# Patient Record
Sex: FEMALE | Race: WHITE | NOT HISPANIC OR LATINO | Employment: OTHER | URBAN - METROPOLITAN AREA
[De-identification: names, ages, dates, MRNs, and addresses within clinical notes are randomized per-mention and may not be internally consistent; named-entity substitution may affect disease eponyms.]

---

## 2021-02-13 ENCOUNTER — OFFICE VISIT (OUTPATIENT)
Dept: URBAN - METROPOLITAN AREA CLINIC 68 | Facility: CLINIC | Age: 81
End: 2021-02-13

## 2021-03-13 ENCOUNTER — OFFICE VISIT (OUTPATIENT)
Dept: URBAN - METROPOLITAN AREA CLINIC 68 | Facility: CLINIC | Age: 81
End: 2021-03-13

## 2021-03-31 ENCOUNTER — OFFICE VISIT (OUTPATIENT)
Dept: URBAN - METROPOLITAN AREA CLINIC 68 | Facility: CLINIC | Age: 81
End: 2021-03-31

## 2021-12-07 ENCOUNTER — OFFICE VISIT (OUTPATIENT)
Dept: URBAN - METROPOLITAN AREA CLINIC 68 | Facility: CLINIC | Age: 81
End: 2021-12-07

## 2021-12-27 ENCOUNTER — TELEPHONE ENCOUNTER (OUTPATIENT)
Dept: URBAN - METROPOLITAN AREA CLINIC 68 | Facility: CLINIC | Age: 81
End: 2021-12-27

## 2022-01-24 ENCOUNTER — OFFICE VISIT (OUTPATIENT)
Dept: URBAN - METROPOLITAN AREA CLINIC 68 | Facility: CLINIC | Age: 82
End: 2022-01-24

## 2022-01-24 ENCOUNTER — TELEPHONE ENCOUNTER (OUTPATIENT)
Dept: URBAN - METROPOLITAN AREA CLINIC 68 | Facility: CLINIC | Age: 82
End: 2022-01-24

## 2022-01-27 ENCOUNTER — OFFICE VISIT (OUTPATIENT)
Dept: URBAN - METROPOLITAN AREA SURGERY CENTER 12 | Facility: SURGERY CENTER | Age: 82
End: 2022-01-27

## 2022-01-28 ENCOUNTER — LAB OUTSIDE AN ENCOUNTER (OUTPATIENT)
Dept: URBAN - METROPOLITAN AREA CLINIC 68 | Facility: CLINIC | Age: 82
End: 2022-01-28

## 2022-01-28 LAB — 01: (no result)

## 2022-02-01 ENCOUNTER — TELEPHONE ENCOUNTER (OUTPATIENT)
Dept: URBAN - METROPOLITAN AREA CLINIC 68 | Facility: CLINIC | Age: 82
End: 2022-02-01

## 2022-02-11 ENCOUNTER — OFFICE VISIT (OUTPATIENT)
Dept: URBAN - METROPOLITAN AREA CLINIC 68 | Facility: CLINIC | Age: 82
End: 2022-02-11

## 2022-04-08 ENCOUNTER — OFFICE VISIT (OUTPATIENT)
Dept: URBAN - METROPOLITAN AREA CLINIC 68 | Facility: CLINIC | Age: 82
End: 2022-04-08

## 2022-06-04 ENCOUNTER — TELEPHONE ENCOUNTER (OUTPATIENT)
Dept: URBAN - METROPOLITAN AREA CLINIC 68 | Facility: CLINIC | Age: 82
End: 2022-06-04

## 2022-06-04 RX ORDER — WHEAT DEXTRIN 3 G/4 G
POWDER (GRAM) ORAL AS DIRECTED
Qty: 1 | Refills: 0 | OUTPATIENT
Start: 2019-12-02 | End: 2020-01-01

## 2022-06-04 RX ORDER — PANTOPRAZOLE SODIUM 40 MG/1
TABLET, DELAYED RELEASE ORAL EVERY 12 HOURS
Qty: 180 | Refills: 180 | OUTPATIENT
Start: 2020-05-05 | End: 2021-12-07

## 2022-06-04 RX ORDER — POLYETHYLENE GLYCOL 3350, SODIUM SULFATE, SODIUM CHLORIDE, POTASSIUM CHLORIDE, ASCORBIC ACID, SODIUM ASCORBATE 140-9-5.2G
KIT ORAL DAILY
Qty: 1 | Refills: 0 | OUTPATIENT
Start: 2021-12-07 | End: 2021-12-08

## 2022-06-04 RX ORDER — ESOMEPRAZOLE MAGNESIUM 20 MG
CAPSULE,DELAYED RELEASE (ENTERIC COATED) ORAL TWICE A DAY
OUTPATIENT
Start: 2013-09-10 | End: 2019-02-06

## 2022-06-04 RX ORDER — NITROFURANTOIN MONOHYDRATE/MACROCRYSTALLINE 25; 75 MG/1; MG/1
MACROBID( 100MG ORAL  PRN ) INACTIVE -HX ENTRY CAPSULE ORAL PRN
OUTPATIENT
Start: 2021-12-07

## 2022-06-04 RX ORDER — VALACYCLOVIR HYDROCHLORIDE 500 MG/1
VALACYCLOVIR HCL( 500MG ORAL  PRN ) INACTIVE -HX ENTRY TABLET, FILM COATED ORAL PRN
OUTPATIENT
Start: 2019-02-07

## 2022-06-04 RX ORDER — FAMOTIDINE 20 MG/1
TABLET, FILM COATED ORAL DAILY
Qty: 30 | Refills: 0 | OUTPATIENT
Start: 2019-12-02 | End: 2020-01-01

## 2022-06-04 RX ORDER — PANTOPRAZOLE SODIUM 20 MG/1
TABLET, DELAYED RELEASE ORAL DAILY
Qty: 90 | Refills: 0 | OUTPATIENT
Start: 2021-12-07 | End: 2022-01-24

## 2022-06-04 RX ORDER — RANITIDINE 300 MG/1
RANITIDINE HCL( 300MG ORAL  AT BEDTIME ) INACTIVE -HX ENTRY TABLET, FILM COATED ORAL AT BEDTIME
OUTPATIENT
Start: 2021-12-07

## 2022-06-04 RX ORDER — ESOMEPRAZOLE MAGNESIUM 20 MG/1
NEXIUM 24HR( 20MG ORAL 2 TWO TIMES DAILY ) INACTIVE -HX ENTRY CAPSULE, DELAYED RELEASE ORAL
OUTPATIENT
Start: 2019-03-07

## 2022-06-05 ENCOUNTER — TELEPHONE ENCOUNTER (OUTPATIENT)
Dept: URBAN - METROPOLITAN AREA CLINIC 68 | Facility: CLINIC | Age: 82
End: 2022-06-05

## 2022-06-05 RX ORDER — PANTOPRAZOLE SODIUM 40 MG/1
TABLET, DELAYED RELEASE ORAL DAILY
Qty: 90 | Refills: 90 | Status: ACTIVE | COMMUNITY
Start: 2022-04-08

## 2022-06-05 RX ORDER — HYDROCHLOROTHIAZIDE 25 MG/1
HYDROCHLOROTHIAZIDE( 25MG ORAL  DAILY ) ACTIVE -HX ENTRY TABLET ORAL DAILY
Status: ACTIVE | COMMUNITY
Start: 2022-04-08

## 2022-06-05 RX ORDER — HYDROXYUREA 500 MG/1
HYDROXYUREA( 500MG ORAL  DAILY ) ACTIVE -HX ENTRY CAPSULE ORAL DAILY
Status: ACTIVE | COMMUNITY
Start: 2022-04-08

## 2022-06-05 RX ORDER — POLYETHYLENE GLYCOL 3350 17 G/DOSE
POWDER (GRAM) ORAL
Status: ACTIVE | COMMUNITY
Start: 2013-09-10

## 2022-06-05 RX ORDER — FAMOTIDINE 40 MG/1
FAMOTIDINE( 40MG ORAL 1 DAILY ) ACTIVE -HX ENTRY TABLET ORAL DAILY
Status: ACTIVE | COMMUNITY
Start: 2022-04-08

## 2022-06-05 RX ORDER — PRAVASTATIN SODIUM 40 MG/1
PRAVASTATIN SODIUM( 40MG ORAL 1/2 TAB DAILY ) ACTIVE -HX ENTRY TABLET ORAL DAILY
Status: ACTIVE | COMMUNITY
Start: 2022-04-08

## 2022-06-25 ENCOUNTER — TELEPHONE ENCOUNTER (OUTPATIENT)
Age: 82
End: 2022-06-25

## 2022-06-25 RX ORDER — POLYETHYLENE GLYCOL 3350, SODIUM SULFATE, SODIUM CHLORIDE, POTASSIUM CHLORIDE, ASCORBIC ACID, SODIUM ASCORBATE 140-9-5.2G
KIT ORAL DAILY
Qty: 1 | Refills: 0 | OUTPATIENT
Start: 2021-12-07 | End: 2021-12-08

## 2022-06-25 RX ORDER — ESOMEPRAZOLE MAGNESIUM 20 MG/1
NEXIUM 24HR( 20MG ORAL 2 TWO TIMES DAILY ) INACTIVE -HX ENTRY CAPSULE, DELAYED RELEASE ORAL
OUTPATIENT
Start: 2019-03-07

## 2022-06-25 RX ORDER — PANTOPRAZOLE 20 MG/1
TABLET, DELAYED RELEASE ORAL DAILY
Qty: 90 | Refills: 0 | OUTPATIENT
Start: 2021-12-07 | End: 2022-01-24

## 2022-06-25 RX ORDER — FAMOTIDINE 20 MG/1
TABLET ORAL DAILY
Qty: 30 | Refills: 0 | OUTPATIENT
Start: 2019-12-02 | End: 2020-01-01

## 2022-06-25 RX ORDER — PANTOPRAZOLE 40 MG/1
TABLET, DELAYED RELEASE ORAL EVERY 12 HOURS
Qty: 180 | Refills: 180 | OUTPATIENT
Start: 2020-05-05 | End: 2021-12-07

## 2022-06-25 RX ORDER — NITROFURANTOIN MONOHYD/M-CRYST 100 MG
MACROBID( 100MG ORAL  PRN ) INACTIVE -HX ENTRY CAPSULE ORAL PRN
OUTPATIENT
Start: 2021-12-07

## 2022-06-25 RX ORDER — ESOMEPRAZOLE MAGNESIUM 20 MG
CAPSULE,DELAYED RELEASE (ENTERIC COATED) ORAL TWICE A DAY
OUTPATIENT
Start: 2013-09-10 | End: 2019-02-06

## 2022-06-25 RX ORDER — WHEAT DEXTRIN 3 G/4 G
POWDER (GRAM) ORAL AS DIRECTED
Qty: 1 | Refills: 0 | OUTPATIENT
Start: 2019-12-02 | End: 2020-01-01

## 2022-06-25 RX ORDER — VALACYCLOVIR 500 MG/1
VALACYCLOVIR HCL( 500MG ORAL  PRN ) INACTIVE -HX ENTRY TABLET, FILM COATED ORAL PRN
OUTPATIENT
Start: 2019-02-07

## 2022-06-25 RX ORDER — CETIRIZINE HYDROCHLORIDE 10 MG/1
ZYRTEC( 5MG ORAL HALF TAB DAILY ) INACTIVE -HX ENTRY TABLET, FILM COATED ORAL DAILY
OUTPATIENT
Start: 2021-12-07

## 2022-06-26 ENCOUNTER — TELEPHONE ENCOUNTER (OUTPATIENT)
Age: 82
End: 2022-06-26

## 2022-06-26 RX ORDER — HYDROXYUREA 500 MG/1
HYDROXYUREA( 500MG ORAL  DAILY ) ACTIVE -HX ENTRY CAPSULE ORAL DAILY
Status: ACTIVE | COMMUNITY
Start: 2022-04-08

## 2022-06-26 RX ORDER — LORATADINE 5 MG
TABLET,CHEWABLE ORAL
Status: ACTIVE | COMMUNITY
Start: 2013-09-10

## 2022-06-26 RX ORDER — HYDROCHLOROTHIAZIDE 25 MG/1
HYDROCHLOROTHIAZIDE( 25MG ORAL  DAILY ) ACTIVE -HX ENTRY TABLET ORAL DAILY
Status: ACTIVE | COMMUNITY
Start: 2022-04-08

## 2022-06-26 RX ORDER — FAMOTIDINE 40 MG/1
FAMOTIDINE( 40MG ORAL 1 DAILY ) ACTIVE -HX ENTRY TABLET, FILM COATED ORAL DAILY
Status: ACTIVE | COMMUNITY
Start: 2022-04-08

## 2022-06-26 RX ORDER — PRAVASTATIN SODIUM 40 MG/1
PRAVASTATIN SODIUM( 40MG ORAL 1/2 TAB DAILY ) ACTIVE -HX ENTRY TABLET ORAL DAILY
Status: ACTIVE | COMMUNITY
Start: 2022-04-08

## 2022-06-26 RX ORDER — PANTOPRAZOLE 40 MG/1
TABLET, DELAYED RELEASE ORAL DAILY
Qty: 90 | Refills: 90 | Status: ACTIVE | COMMUNITY
Start: 2022-04-08

## 2022-11-03 ENCOUNTER — OFFICE VISIT (OUTPATIENT)
Dept: URBAN - METROPOLITAN AREA CLINIC 68 | Facility: CLINIC | Age: 82
End: 2022-11-03

## 2022-11-03 ENCOUNTER — LAB OUTSIDE AN ENCOUNTER (OUTPATIENT)
Dept: URBAN - METROPOLITAN AREA CLINIC 68 | Facility: CLINIC | Age: 82
End: 2022-11-03

## 2022-11-03 RX ORDER — HYDROCHLOROTHIAZIDE 25 MG/1
HYDROCHLOROTHIAZIDE( 25MG ORAL  DAILY ) ACTIVE -HX ENTRY TABLET ORAL DAILY
Status: ACTIVE | COMMUNITY
Start: 2022-04-08

## 2022-11-03 RX ORDER — POLYETHYLENE GLYCOL 3350 17 G/DOSE
POWDER (GRAM) ORAL
Status: ACTIVE | COMMUNITY
Start: 2013-09-10

## 2022-11-03 RX ORDER — PRAVASTATIN SODIUM 40 MG/1
PRAVASTATIN SODIUM( 40MG ORAL 1/2 TAB DAILY ) ACTIVE -HX ENTRY TABLET ORAL DAILY
Status: ACTIVE | COMMUNITY
Start: 2022-04-08

## 2022-11-03 RX ORDER — PANTOPRAZOLE SODIUM 40 MG/1
TABLET, DELAYED RELEASE ORAL DAILY
Qty: 90 | Refills: 90 | Status: ACTIVE | COMMUNITY
Start: 2022-04-08

## 2022-11-03 RX ORDER — PANTOPRAZOLE SODIUM 40 MG/1
1 TABLET TABLET, DELAYED RELEASE ORAL EVERY 12 HOURS
Qty: 180 TABLET | OUTPATIENT
Start: 2022-11-03

## 2022-11-03 RX ORDER — FAMOTIDINE 40 MG/1
FAMOTIDINE( 40MG ORAL 1 DAILY ) ACTIVE -HX ENTRY TABLET ORAL DAILY
Status: DISCONTINUED | COMMUNITY
Start: 2022-04-08

## 2022-11-03 RX ORDER — HYDROXYUREA 500 MG/1
HYDROXYUREA( 500MG ORAL  DAILY ) ACTIVE -HX ENTRY CAPSULE ORAL DAILY
Status: ACTIVE | COMMUNITY
Start: 2022-04-08

## 2022-11-03 NOTE — HPI-MIGRATED HPI
General : 81 YOF that comes in today for evaluation. She refers that for 3 days she had black stools. She also had been feeling upper abdominal discomfort. She refers she took an OTC medication for her upper abdominal sympthoms, and she feels this cause her black stools. She is unable to specify the medication but it was liquid. Today she has no upper abdominal symptomatology. She refers she had BM this morning and normal.

## 2022-11-17 ENCOUNTER — OFFICE VISIT (OUTPATIENT)
Dept: URBAN - METROPOLITAN AREA SURGERY CENTER 12 | Facility: SURGERY CENTER | Age: 82
End: 2022-11-17

## 2023-04-18 ENCOUNTER — OFFICE VISIT (OUTPATIENT)
Dept: URBAN - METROPOLITAN AREA CLINIC 68 | Facility: CLINIC | Age: 83
End: 2023-04-18

## 2023-04-18 RX ORDER — PANTOPRAZOLE SODIUM 40 MG/1
1 TABLET TABLET, DELAYED RELEASE ORAL EVERY 12 HOURS
Qty: 180 TABLET | Refills: 0
Start: 2022-04-08

## 2023-04-18 RX ORDER — POLYETHYLENE GLYCOL 3350 17 G/DOSE
POWDER (GRAM) ORAL
Status: ACTIVE | COMMUNITY
Start: 2013-09-10

## 2023-04-18 RX ORDER — HYDROXYUREA 500 MG/1
HYDROXYUREA( 500MG ORAL  DAILY ) ACTIVE -HX ENTRY CAPSULE ORAL DAILY
Status: ACTIVE | COMMUNITY
Start: 2022-04-08

## 2023-04-18 RX ORDER — PRAVASTATIN SODIUM 40 MG/1
PRAVASTATIN SODIUM( 40MG ORAL 1/2 TAB DAILY ) ACTIVE -HX ENTRY TABLET ORAL DAILY
Status: ACTIVE | COMMUNITY
Start: 2022-04-08

## 2023-04-18 RX ORDER — SOD SULF/POT CHLORIDE/MAG SULF 1.479 G
AS DIRECTED TABLET ORAL ONCE
Qty: 1 KIT | OUTPATIENT
Start: 2023-04-18 | End: 2023-04-19

## 2023-04-18 RX ORDER — PANTOPRAZOLE SODIUM 40 MG/1
TABLET, DELAYED RELEASE ORAL DAILY
Qty: 90 | Refills: 90 | Status: ACTIVE | COMMUNITY
Start: 2022-04-08

## 2023-04-18 NOTE — HPI-MIGRATED HPI
General : Upon questioning she stated feeling well without acute complaints. Furthermore she denied episodes of neither bright red blood per rectum (hematochezia) nor black/tarry stools (melena). She also denied any unexplained significant weight loss, frequent abdominal pain/distention, nausea, vomits, early satiety, tenesmus, rectal pain and changes in bowel habits or stool caliber. She did not complain of episodes of diarrhea interspersed with constipation. She did refer weight lost

## 2023-04-24 ENCOUNTER — TELEPHONE ENCOUNTER (OUTPATIENT)
Dept: URBAN - METROPOLITAN AREA CLINIC 68 | Facility: CLINIC | Age: 83
End: 2023-04-24

## 2023-05-11 ENCOUNTER — DASHBOARD ENCOUNTERS (OUTPATIENT)
Age: 83
End: 2023-05-11

## 2023-05-11 ENCOUNTER — OFFICE VISIT (OUTPATIENT)
Dept: URBAN - METROPOLITAN AREA SURGERY CENTER 12 | Facility: SURGERY CENTER | Age: 83
End: 2023-05-11

## 2023-05-11 RX ORDER — POLYETHYLENE GLYCOL 3350 17 G/DOSE
POWDER (GRAM) ORAL
Status: ACTIVE | COMMUNITY
Start: 2013-09-10

## 2023-05-11 RX ORDER — PANTOPRAZOLE SODIUM 40 MG/1
1 TABLET TABLET, DELAYED RELEASE ORAL EVERY 12 HOURS
Qty: 180 TABLET | Refills: 0 | Status: ACTIVE | COMMUNITY
Start: 2022-04-08

## 2023-05-11 RX ORDER — PRAVASTATIN SODIUM 40 MG/1
PRAVASTATIN SODIUM( 40MG ORAL 1/2 TAB DAILY ) ACTIVE -HX ENTRY TABLET ORAL DAILY
Status: ACTIVE | COMMUNITY
Start: 2022-04-08

## 2023-05-11 RX ORDER — HYDROXYUREA 500 MG/1
HYDROXYUREA( 500MG ORAL  DAILY ) ACTIVE -HX ENTRY CAPSULE ORAL DAILY
Status: ACTIVE | COMMUNITY
Start: 2022-04-08

## 2023-05-18 ENCOUNTER — TELEPHONE ENCOUNTER (OUTPATIENT)
Dept: URBAN - METROPOLITAN AREA CLINIC 68 | Facility: CLINIC | Age: 83
End: 2023-05-18

## 2023-07-24 ENCOUNTER — TELEPHONE ENCOUNTER (OUTPATIENT)
Dept: URBAN - METROPOLITAN AREA CLINIC 68 | Facility: CLINIC | Age: 83
End: 2023-07-24

## 2023-10-17 ENCOUNTER — TELEPHONE ENCOUNTER (OUTPATIENT)
Dept: URBAN - METROPOLITAN AREA CLINIC 68 | Facility: CLINIC | Age: 83
End: 2023-10-17

## 2024-05-23 ENCOUNTER — ERX REFILL RESPONSE (OUTPATIENT)
Dept: URBAN - METROPOLITAN AREA CLINIC 68 | Facility: CLINIC | Age: 84
End: 2024-05-23

## 2024-05-23 RX ORDER — PANTOPRAZOLE 40 MG/1
TAKE 1 TABLET BY MOUTH TWICE DAILY TABLET, DELAYED RELEASE ORAL
Qty: 180 TABLET | Refills: 0 | OUTPATIENT

## 2024-05-23 RX ORDER — PANTOPRAZOLE 40 MG/1
TAKE 1 TABLET BY MOUTH TWICE DAILY TABLET, DELAYED RELEASE ORAL
Qty: 180 TABLET | Refills: 1 | OUTPATIENT

## 2024-09-24 ENCOUNTER — TELEPHONE ENCOUNTER (OUTPATIENT)
Dept: URBAN - METROPOLITAN AREA CLINIC 68 | Facility: CLINIC | Age: 84
End: 2024-09-24

## 2024-09-24 RX ORDER — PANTOPRAZOLE 40 MG/1
1 TABLET TABLET, DELAYED RELEASE ORAL EVERY 12 HOURS
Qty: 180 TABLET | Refills: 0